# Patient Record
Sex: FEMALE | Race: WHITE | Employment: OTHER | URBAN - METROPOLITAN AREA
[De-identification: names, ages, dates, MRNs, and addresses within clinical notes are randomized per-mention and may not be internally consistent; named-entity substitution may affect disease eponyms.]

---

## 2024-03-21 DIAGNOSIS — Z01.818 PRE-OP TESTING: ICD-10-CM

## 2024-03-21 DIAGNOSIS — R91.1 LUNG NODULE: Primary | ICD-10-CM

## 2024-03-21 NOTE — PROGRESS NOTES
Bronchoscopy Scheduling Requestwith Bronchoscopy group provider    Pre-bronchoscopy visit: New patient visit with Bronchoscopy group provider  Please schedule procedure: Next available    Cytology on-site:  Yes  Location:  [facility]  Performing physician:  Advanced diagnostic bronchoscopist  Referring physician:  Shay Allen MD, No primary care provider on file.  Indication:  WILI nodule  Sedation / Anesthesia:  GA  Procedure:  Navigational bronchoscopy, Staging EBUS  Time:  Tier 3  Fluorscopy:   Yes  Imaging needed:  CT Evan - same day as procedure  Labs:  CBC, BMP  Meds:  None?  Special Considerations:  None  Reviewed by:  Rafael Mendez MD

## 2024-04-11 NOTE — H&P (VIEW-ONLY)
Patient: Rand Aguirre    [id number]  : 1960 -- AGE 64 y.o.    Provider: MAYITO Carty     Location [facility]   Service Date: 4/15/2024       Department of Medicine  Division of Pulmonary, Critical Care, and Sleep Medicine       [facility]  New Visit Note    Virtual or Telephone Consent  A telephone visit (audio only) between the patient (at the originating site) and the provider (at the distant site) was utilized to provide this telehealth service.   Verbal consent was requested and obtained from Rand Aguirre on this date, 04/15/24 for a telehealth visit.     HISTORY OF PRESENT ILLNESS     Pulm: Dr. Shay Allen / Beatrice Greco PA-C    HISTORY OF PRESENT ILLNESS   Rand Aguirre is a 64 y.o. female who presents to a [facility] for an evaluation with concerns of left lung nodule.  I have independently interviewed and examined the patient in the office and reviewed available records.    Current History  This is a patient of pulmonologist Dr. Shay Allen. Limited information in the EMR due to coming from and outside hospital system.  Upon review of the scanned note into the EMR patient had a PET/CT completed on 3/15/2024 showing a PET avid spiculated left upper lobe nodule as well as a few additional smaller nodular densities with PET avid mediastinal adenopathy.  CT chest and PET/CT are loaded into PACS.  Patient was referred to  interventional pulmonology for further assessment and tissue biopsy.    On today's visit, the patient reports both SOB at rest as well as LLANOS. Uses Breztri daily. Does DuoNebs every 4 hours with Albuterol in between every 4 hours. Started Tezspire therapy about 8 months ago. Reports a chronic cough; clear thick mucous. Lots of wheezing. No hemoptysis. Has gained about 54lbs in the past year due to prednisone use. No recent fever, night sweats, chills. ? Orthopnea. No known  lower leg swelling. GERD controlled with pantoprazole. Denies CP, palpitations.     Denies premature birth. Childhood asthma. Has never been on home oxygen therapy before.    Prior Notes & History       REVIEW OF SYSTEMS     REVIEW OF SYSTEMS  Review of Systems   Constitutional:  Positive for activity change, fatigue and unexpected weight change. Negative for appetite change, chills and fever.   HENT:  Positive for congestion and rhinorrhea. Negative for postnasal drip, sinus pressure, sinus pain, sneezing, sore throat, trouble swallowing and voice change.         Denies throat clearing   Eyes:  Negative for redness and itching.   Respiratory:  Positive for cough, chest tightness, shortness of breath and wheezing. Negative for stridor.    Cardiovascular:  Negative for chest pain, palpitations and leg swelling.        Denies orthopnea   Gastrointestinal:  Negative for abdominal pain, diarrhea, nausea and vomiting.        Denies acid reflux   Musculoskeletal:  Positive for back pain. Negative for arthralgias, joint swelling and myalgias.   Skin:  Negative for rash.   Allergic/Immunologic: Negative for immunocompromised state.   Neurological:  Positive for weakness. Negative for dizziness, tremors and headaches.   Hematological:  Does not bruise/bleed easily.   Psychiatric/Behavioral:  Negative for agitation and sleep disturbance. The patient is not nervous/anxious.         Denies depression   All other systems reviewed and are negative.      ALLERGIES AND MEDICATIONS     ALLERGIES  Allergies   Allergen Reactions    Codeine Rash, Shortness of breath and Unknown    Levofloxacin Shortness of breath    Tiotropium Bromide Hives and Rash     Palpitations. Duoneb aerosols are OK    Adhesive Tape-Silicones Unknown and Swelling    Desonide Unknown    Iodinated Contrast Media Swelling    Iodine Hives and Swelling    Sulfamethoxazole-Trimethoprim Rash     Rash in mouth       MEDICATIONS  Current Outpatient Medications    Medication Sig Dispense Refill    albuterol 90 mcg/actuation inhaler Inhale 2 puffs every 6 hours if needed.      amoxicillin-pot clavulanate (Augmentin) 875-125 mg tablet Take 1 tablet (875 mg) by mouth every 12 hours. For 7 days      azelastine (Astelin) 137 mcg (0.1 %) nasal spray Administer 1 spray into each nostril 2 times a day.      azithromycin (Zithromax) 250 mg tablet Take 1 tablet (250 mg) by mouth 3 (three) times a week.      biotin 10,000 mcg capsule Take 1 capsule (10 mg) by mouth once daily.      Breztri Aerosphere 160-9-4.8 mcg/actuation HFA aerosol inhaler Inhale 2 puffs 2 times a day.      cholecalciferol (Vitamin D3) 50 MCG (2000 UT) tablet Take 1 tablet (50 mcg) by mouth once daily.      cyanocobalamin (Vitamin B-12) 1,000 mcg tablet Take 1 tablet (1,000 mcg) by mouth once daily.      FLUoxetine (PROzac) 20 mg capsule Take 1 capsule (20 mg) by mouth once daily.      fluticasone (Flonase) 50 mcg/actuation nasal spray 2 sprays by Does not apply route once daily.      ipratropium-albuteroL (Duo-Neb) 0.5-2.5 mg/3 mL nebulizer solution Take 3 mL by nebulization every 4 hours if needed for shortness of breath or wheezing.      L.acidoph,saliva-B.bif-S.therm (Acidophilus Probiotic Blend) 175 mg capsule Take 1 capsule by mouth early in the morning..      levothyroxine (Synthroid, Levoxyl) 112 mcg tablet Take 1 tablet (112 mcg) by mouth once daily in the morning. Take before meals.      montelukast (Singulair) 10 mg tablet Take 1 tablet (10 mg) by mouth early in the morning..      predniSONE (Deltasone) 20 mg tablet once every 24 hours. 3 tablets once a day for 4 days, 2 tablets once a day for 4 days, 1 tablet once a day for 4 days, 0.5 tablet once a day for 4 days Orally Once a day for 16 days      predniSONE 10 mg tablets,dose pack 50mg/day x 3 days, 40 mg/day x 3 days, 30 mg/day x 3 days, 20 mg/day x 3 days, 10 mg/day x 3 days Orally Once a day for 15 days      pseudoephedrine (Sudafed) 30 mg tablet  "Take 1 tablet (30 mg) by mouth early in the morning..      Tezspire SubQ syringe Inject 210 mg under the skin every 28 (twenty-eight) days.      turmeric root extract 500 mg tablet Take 1 tablet by mouth early in the morning..      vitamin E acetate (VITAMIN E ORAL) Take 1 tablet by mouth once daily.       No current facility-administered medications for this visit.       PAST HISTORY     PAST MEDICAL HISTORY  She  has no past medical history on file.    PAST SURGICAL HISTORY  No past surgical history on file.    IMMUNIZATION HISTORY    There is no immunization history on file for this patient.    SOCIAL HISTORY  She  reports that she has quit smoking. Her smoking use included cigarettes. She started smoking about 47 years ago. She has a 70.9 pack-year smoking history. She has never used smokeless tobacco. She reports current alcohol use. She reports that she does not currently use drugs.     FAMILY HISTORY  No family history on file.      PHYSICAL EXAM     VITAL SIGNS: There were no vitals taken for this visit.     PREVIOUS WEIGHTS:  Wt Readings from Last 3 Encounters:   No data found for Wt       Physical Exam  No physical exam performed; virtual visit.  RESULTS/DATA     Pulmonary Function Test Results     No PFT on record    Chest Radiograph     No results found for this or any previous visit from the past 365 days.      Chest CT Scan   CTA Chest  2/23/24: Images in PACS    PET CT  3/15/24: Images in PACS    Echocardiogram & Cardiac Studies     No results found for this or any previous visit from the past 365 days.       Labwork & Pathology   Complete Blood Count  No results found for: \"WBC\", \"HGB\", \"HCT\", \"MCV\", \"PLT\"    Peripheral Eosinophil Count:   No results found for: \"EOSABS\"    Serum Immunoglobulin E:    No results found for: \"IGE\"     Metabolic Parameters  No results found for: \"NA\", \"K\", \"CL\", \"CO2\", \"ANIONGAP\", \"BUN\", \"CREATININE\", \"GFRMALE\", \"GFRF\", \"GLUCOSE\", \"CALCIUM\", \"PHOS\", \"ZINC\", \"AST\", " "\"ALT\"    Bronchoscopy & Pathology/Cultures       ASSESSMENT/PLAN     Ms. Aguirre is a 64 y.o. female; was referred to the [facility] for evaluation of No chief complaint on file.    Problem List and Orders  Diagnoses and all orders for this visit:  Lung nodule      Assessment and Plan / Recommendations:  Patient's visit was converted to a virtual visit.    1. WILI nodule/mass: patient had a PET/CT completed on 3/15/2024 showing a PET avid spiculated left upper lobe nodule as well as a few additional smaller nodular densities with PET avid mediastinal adenopathy.  CT chest and PET/CT are loaded into PACS.  Patient was referred to  interventional pulmonology for further assessment and tissue biopsy.  - Plan for bronchoscopy with biopsy. Nagivational CT, staging EBUS. CT marky prior  - Lab work prior to procedure; ordered. Will be completed tomorrow 4/16/24  - Not on any anticoagulation     I explained the procedure to the patient. We discussed that the bronchoscopy will be performed by a member of the Interventional Pulmonary Team; depending on scheduling and provider availability. We also discussed that the IP providers function as a team and not infrequently may have to fill in for one another if there are emergent issues that need attention at the same time. The patient / family expressed understanding and agreed to proceed. All questions were answered.     Patient's visit was converted to a virtual visit. Spoke with the patient via phone for 13 minutes.    Prep: 10 minutes  Phone/Video: 13 minutes  Total: 23 minutes    "

## 2024-04-11 NOTE — PROGRESS NOTES
Patient: Rand Aguirre    [id number]  : 1960 -- AGE 64 y.o.    Provider: MAYITO Carty     Location [facility]   Service Date: 4/15/2024       Department of Medicine  Division of Pulmonary, Critical Care, and Sleep Medicine       [facility]  New Visit Note    Virtual or Telephone Consent  A telephone visit (audio only) between the patient (at the originating site) and the provider (at the distant site) was utilized to provide this telehealth service.   Verbal consent was requested and obtained from Rand Aguirre on this date, 04/15/24 for a telehealth visit.     HISTORY OF PRESENT ILLNESS     Pulm: Dr. Shay Allen / Beatrice Greco PA-C    HISTORY OF PRESENT ILLNESS   Rand Aguirre is a 64 y.o. female who presents to a [facility] for an evaluation with concerns of left lung nodule.  I have independently interviewed and examined the patient in the office and reviewed available records.    Current History  This is a patient of pulmonologist Dr. Shay Allen. Limited information in the EMR due to coming from and outside hospital system.  Upon review of the scanned note into the EMR patient had a PET/CT completed on 3/15/2024 showing a PET avid spiculated left upper lobe nodule as well as a few additional smaller nodular densities with PET avid mediastinal adenopathy.  CT chest and PET/CT are loaded into PACS.  Patient was referred to  interventional pulmonology for further assessment and tissue biopsy.    On today's visit, the patient reports both SOB at rest as well as LLANOS. Uses Breztri daily. Does DuoNebs every 4 hours with Albuterol in between every 4 hours. Started Tezspire therapy about 8 months ago. Reports a chronic cough; clear thick mucous. Lots of wheezing. No hemoptysis. Has gained about 54lbs in the past year due to prednisone use. No recent fever, night sweats, chills. ? Orthopnea. No known  lower leg swelling. GERD controlled with pantoprazole. Denies CP, palpitations.     Denies premature birth. Childhood asthma. Has never been on home oxygen therapy before.    Prior Notes & History       REVIEW OF SYSTEMS     REVIEW OF SYSTEMS  Review of Systems   Constitutional:  Positive for activity change, fatigue and unexpected weight change. Negative for appetite change, chills and fever.   HENT:  Positive for congestion and rhinorrhea. Negative for postnasal drip, sinus pressure, sinus pain, sneezing, sore throat, trouble swallowing and voice change.         Denies throat clearing   Eyes:  Negative for redness and itching.   Respiratory:  Positive for cough, chest tightness, shortness of breath and wheezing. Negative for stridor.    Cardiovascular:  Negative for chest pain, palpitations and leg swelling.        Denies orthopnea   Gastrointestinal:  Negative for abdominal pain, diarrhea, nausea and vomiting.        Denies acid reflux   Musculoskeletal:  Positive for back pain. Negative for arthralgias, joint swelling and myalgias.   Skin:  Negative for rash.   Allergic/Immunologic: Negative for immunocompromised state.   Neurological:  Positive for weakness. Negative for dizziness, tremors and headaches.   Hematological:  Does not bruise/bleed easily.   Psychiatric/Behavioral:  Negative for agitation and sleep disturbance. The patient is not nervous/anxious.         Denies depression   All other systems reviewed and are negative.      ALLERGIES AND MEDICATIONS     ALLERGIES  Allergies   Allergen Reactions    Codeine Rash, Shortness of breath and Unknown    Levofloxacin Shortness of breath    Tiotropium Bromide Hives and Rash     Palpitations. Duoneb aerosols are OK    Adhesive Tape-Silicones Unknown and Swelling    Desonide Unknown    Iodinated Contrast Media Swelling    Iodine Hives and Swelling    Sulfamethoxazole-Trimethoprim Rash     Rash in mouth       MEDICATIONS  Current Outpatient Medications    Medication Sig Dispense Refill    albuterol 90 mcg/actuation inhaler Inhale 2 puffs every 6 hours if needed.      amoxicillin-pot clavulanate (Augmentin) 875-125 mg tablet Take 1 tablet (875 mg) by mouth every 12 hours. For 7 days      azelastine (Astelin) 137 mcg (0.1 %) nasal spray Administer 1 spray into each nostril 2 times a day.      azithromycin (Zithromax) 250 mg tablet Take 1 tablet (250 mg) by mouth 3 (three) times a week.      biotin 10,000 mcg capsule Take 1 capsule (10 mg) by mouth once daily.      Breztri Aerosphere 160-9-4.8 mcg/actuation HFA aerosol inhaler Inhale 2 puffs 2 times a day.      cholecalciferol (Vitamin D3) 50 MCG (2000 UT) tablet Take 1 tablet (50 mcg) by mouth once daily.      cyanocobalamin (Vitamin B-12) 1,000 mcg tablet Take 1 tablet (1,000 mcg) by mouth once daily.      FLUoxetine (PROzac) 20 mg capsule Take 1 capsule (20 mg) by mouth once daily.      fluticasone (Flonase) 50 mcg/actuation nasal spray 2 sprays by Does not apply route once daily.      ipratropium-albuteroL (Duo-Neb) 0.5-2.5 mg/3 mL nebulizer solution Take 3 mL by nebulization every 4 hours if needed for shortness of breath or wheezing.      L.acidoph,saliva-B.bif-S.therm (Acidophilus Probiotic Blend) 175 mg capsule Take 1 capsule by mouth early in the morning..      levothyroxine (Synthroid, Levoxyl) 112 mcg tablet Take 1 tablet (112 mcg) by mouth once daily in the morning. Take before meals.      montelukast (Singulair) 10 mg tablet Take 1 tablet (10 mg) by mouth early in the morning..      predniSONE (Deltasone) 20 mg tablet once every 24 hours. 3 tablets once a day for 4 days, 2 tablets once a day for 4 days, 1 tablet once a day for 4 days, 0.5 tablet once a day for 4 days Orally Once a day for 16 days      predniSONE 10 mg tablets,dose pack 50mg/day x 3 days, 40 mg/day x 3 days, 30 mg/day x 3 days, 20 mg/day x 3 days, 10 mg/day x 3 days Orally Once a day for 15 days      pseudoephedrine (Sudafed) 30 mg tablet  "Take 1 tablet (30 mg) by mouth early in the morning..      Tezspire SubQ syringe Inject 210 mg under the skin every 28 (twenty-eight) days.      turmeric root extract 500 mg tablet Take 1 tablet by mouth early in the morning..      vitamin E acetate (VITAMIN E ORAL) Take 1 tablet by mouth once daily.       No current facility-administered medications for this visit.       PAST HISTORY     PAST MEDICAL HISTORY  She  has no past medical history on file.    PAST SURGICAL HISTORY  No past surgical history on file.    IMMUNIZATION HISTORY    There is no immunization history on file for this patient.    SOCIAL HISTORY  She  reports that she has quit smoking. Her smoking use included cigarettes. She started smoking about 47 years ago. She has a 70.9 pack-year smoking history. She has never used smokeless tobacco. She reports current alcohol use. She reports that she does not currently use drugs.     FAMILY HISTORY  No family history on file.      PHYSICAL EXAM     VITAL SIGNS: There were no vitals taken for this visit.     PREVIOUS WEIGHTS:  Wt Readings from Last 3 Encounters:   No data found for Wt       Physical Exam  No physical exam performed; virtual visit.  RESULTS/DATA     Pulmonary Function Test Results     No PFT on record    Chest Radiograph     No results found for this or any previous visit from the past 365 days.      Chest CT Scan   CTA Chest  2/23/24: Images in PACS    PET CT  3/15/24: Images in PACS    Echocardiogram & Cardiac Studies     No results found for this or any previous visit from the past 365 days.       Labwork & Pathology   Complete Blood Count  No results found for: \"WBC\", \"HGB\", \"HCT\", \"MCV\", \"PLT\"    Peripheral Eosinophil Count:   No results found for: \"EOSABS\"    Serum Immunoglobulin E:    No results found for: \"IGE\"     Metabolic Parameters  No results found for: \"NA\", \"K\", \"CL\", \"CO2\", \"ANIONGAP\", \"BUN\", \"CREATININE\", \"GFRMALE\", \"GFRF\", \"GLUCOSE\", \"CALCIUM\", \"PHOS\", \"ZINC\", \"AST\", " "\"ALT\"    Bronchoscopy & Pathology/Cultures       ASSESSMENT/PLAN     Ms. Aguirre is a 64 y.o. female; was referred to the [facility] for evaluation of No chief complaint on file.    Problem List and Orders  Diagnoses and all orders for this visit:  Lung nodule      Assessment and Plan / Recommendations:  Patient's visit was converted to a virtual visit.    1. WILI nodule/mass: patient had a PET/CT completed on 3/15/2024 showing a PET avid spiculated left upper lobe nodule as well as a few additional smaller nodular densities with PET avid mediastinal adenopathy.  CT chest and PET/CT are loaded into PACS.  Patient was referred to  interventional pulmonology for further assessment and tissue biopsy.  - Plan for bronchoscopy with biopsy. Nagivational CT, staging EBUS. CT marky prior  - Lab work prior to procedure; ordered. Will be completed tomorrow 4/16/24  - Not on any anticoagulation     I explained the procedure to the patient. We discussed that the bronchoscopy will be performed by a member of the Interventional Pulmonary Team; depending on scheduling and provider availability. We also discussed that the IP providers function as a team and not infrequently may have to fill in for one another if there are emergent issues that need attention at the same time. The patient / family expressed understanding and agreed to proceed. All questions were answered.     Patient's visit was converted to a virtual visit. Spoke with the patient via phone for 13 minutes.    Prep: 10 minutes  Phone/Video: 13 minutes  Total: 23 minutes    "

## 2024-04-15 ENCOUNTER — TELEMEDICINE (OUTPATIENT)
Dept: PULMONOLOGY | Facility: CLINIC | Age: 64
End: 2024-04-15
Payer: COMMERCIAL

## 2024-04-15 DIAGNOSIS — R91.1 LUNG NODULE: Primary | ICD-10-CM

## 2024-04-15 PROCEDURE — 1036F TOBACCO NON-USER: CPT | Performed by: NURSE PRACTITIONER

## 2024-04-15 PROCEDURE — 99443 PR PHYS/QHP TELEPHONE EVALUATION 21-30 MIN: CPT | Performed by: NURSE PRACTITIONER

## 2024-04-15 RX ORDER — PSEUDOEPHEDRINE HCL 30 MG
30 TABLET ORAL DAILY
COMMUNITY

## 2024-04-15 RX ORDER — FLUTICASONE PROPIONATE 50 MCG
2 SPRAY, SUSPENSION (ML) NASAL
COMMUNITY
Start: 2022-07-13

## 2024-04-15 RX ORDER — AMOXICILLIN AND CLAVULANATE POTASSIUM 875; 125 MG/1; MG/1
875 TABLET, FILM COATED ORAL EVERY 12 HOURS
COMMUNITY
Start: 2024-04-09

## 2024-04-15 RX ORDER — AZITHROMYCIN 250 MG/1
250 TABLET, FILM COATED ORAL 3 TIMES WEEKLY
COMMUNITY

## 2024-04-15 RX ORDER — PREDNISONE 10 MG/1
TABLET ORAL
COMMUNITY
Start: 2022-12-14

## 2024-04-15 RX ORDER — ACETAMINOPHEN AND PHENYLEPHRINE HCL 325; 5 MG/1; MG/1
1 TABLET ORAL DAILY
COMMUNITY

## 2024-04-15 RX ORDER — TURMERIC ROOT EXTRACT 500 MG
1 TABLET ORAL DAILY
COMMUNITY

## 2024-04-15 RX ORDER — IPRATROPIUM BROMIDE AND ALBUTEROL SULFATE 2.5; .5 MG/3ML; MG/3ML
3 SOLUTION RESPIRATORY (INHALATION) EVERY 4 HOURS PRN
COMMUNITY

## 2024-04-15 RX ORDER — AZELASTINE 1 MG/ML
1 SPRAY, METERED NASAL 2 TIMES DAILY
COMMUNITY

## 2024-04-15 RX ORDER — PREDNISONE 20 MG/1
TABLET ORAL EVERY 24 HOURS
COMMUNITY
Start: 2023-02-14

## 2024-04-15 RX ORDER — LANOLIN ALCOHOL/MO/W.PET/CERES
1000 CREAM (GRAM) TOPICAL DAILY
COMMUNITY

## 2024-04-15 RX ORDER — ALBUTEROL SULFATE 90 UG/1
2 AEROSOL, METERED RESPIRATORY (INHALATION) EVERY 6 HOURS PRN
COMMUNITY

## 2024-04-15 RX ORDER — CHOLECALCIFEROL (VITAMIN D3) 50 MCG
50 TABLET ORAL DAILY
COMMUNITY

## 2024-04-15 RX ORDER — MONTELUKAST SODIUM 10 MG/1
10 TABLET ORAL DAILY
COMMUNITY

## 2024-04-15 RX ORDER — TEZEPELUMAB-EKKO 210 MG/1.9ML
210 INJECTION, SOLUTION SUBCUTANEOUS
COMMUNITY
Start: 2023-04-27

## 2024-04-15 RX ORDER — LEVOTHYROXINE SODIUM 112 UG/1
1 TABLET ORAL
COMMUNITY
Start: 2024-02-28

## 2024-04-15 RX ORDER — BUDESONIDE, GLYCOPYRROLATE, AND FORMOTEROL FUMARATE 160; 9; 4.8 UG/1; UG/1; UG/1
2 AEROSOL, METERED RESPIRATORY (INHALATION)
COMMUNITY
Start: 2023-03-03

## 2024-04-15 RX ORDER — GINSENG 100 MG
1 CAPSULE ORAL DAILY
COMMUNITY

## 2024-04-15 RX ORDER — FLUOXETINE HYDROCHLORIDE 20 MG/1
20 CAPSULE ORAL
COMMUNITY
Start: 2024-02-28

## 2024-04-15 ASSESSMENT — PATIENT HEALTH QUESTIONNAIRE - PHQ9
2. FEELING DOWN, DEPRESSED OR HOPELESS: NOT AT ALL
1. LITTLE INTEREST OR PLEASURE IN DOING THINGS: NOT AT ALL
SUM OF ALL RESPONSES TO PHQ9 QUESTIONS 1 & 2: 0

## 2024-04-15 ASSESSMENT — LIFESTYLE VARIABLES
SKIP TO QUESTIONS 9-10: 1
HOW MANY STANDARD DRINKS CONTAINING ALCOHOL DO YOU HAVE ON A TYPICAL DAY: 1 OR 2
HOW OFTEN DO YOU HAVE A DRINK CONTAINING ALCOHOL: 2-4 TIMES A MONTH
HOW OFTEN DO YOU HAVE SIX OR MORE DRINKS ON ONE OCCASION: NEVER
AUDIT-C TOTAL SCORE: 2

## 2024-04-15 ASSESSMENT — ENCOUNTER SYMPTOMS
PALPITATIONS: 0
EYE REDNESS: 0
TROUBLE SWALLOWING: 0
BRUISES/BLEEDS EASILY: 0
STRIDOR: 0
VOMITING: 0
ROS GI COMMENTS: DENIES ACID REFLUX
EYE ITCHING: 0
MYALGIAS: 0
ABDOMINAL PAIN: 0
NAUSEA: 0
TREMORS: 0
COUGH: 1
DIZZINESS: 0
WEAKNESS: 1
AGITATION: 0
SINUS PRESSURE: 0
SHORTNESS OF BREATH: 1
ACTIVITY CHANGE: 1
SLEEP DISTURBANCE: 0
WHEEZING: 1
DIARRHEA: 0
VOICE CHANGE: 0
ARTHRALGIAS: 0
NERVOUS/ANXIOUS: 0
FATIGUE: 1
FEVER: 0
BACK PAIN: 1
CHILLS: 0
HEADACHES: 0
SORE THROAT: 0
APPETITE CHANGE: 0
RHINORRHEA: 1
UNEXPECTED WEIGHT CHANGE: 1
CHEST TIGHTNESS: 1
SINUS PAIN: 0
JOINT SWELLING: 0

## 2024-04-25 ENCOUNTER — ANESTHESIA EVENT (OUTPATIENT)
Dept: GASTROENTEROLOGY | Facility: HOSPITAL | Age: 64
End: 2024-04-25
Payer: COMMERCIAL

## 2024-04-25 PROBLEM — J45.909 ASTHMA (HHS-HCC): Status: ACTIVE | Noted: 2024-04-25

## 2024-04-25 PROBLEM — M06.9 RHEUMATOID ARTHRITIS (MULTI): Status: ACTIVE | Noted: 2024-04-25

## 2024-04-25 PROBLEM — G47.33 OSA (OBSTRUCTIVE SLEEP APNEA): Status: ACTIVE | Noted: 2024-04-25

## 2024-04-25 PROBLEM — R91.8 PULMONARY NODULES: Status: ACTIVE | Noted: 2024-03-21

## 2024-04-25 PROBLEM — K21.9 GASTROESOPHAGEAL REFLUX DISEASE: Status: ACTIVE | Noted: 2024-04-25

## 2024-04-25 NOTE — ANESTHESIA PREPROCEDURE EVALUATION
Patient: Rand Aguirre    Procedure Information       Date/Time: 04/26/24 0800    Scheduled providers: Hugh Null MD; Janice Montano RN    Procedure: BRONCHOSCOPY    Location: [facility]            Relevant Problems   Anesthesia (within normal limits)  No family hx MH      Cardiac (within normal limits)      Pulmonary  Started O2 last week   (+) Asthma (HHS-HCC)   (+) ANTWAN (obstructive sleep apnea)   (+) Pulmonary nodules      Neuro (within normal limits)      GI  Gastrectomy 2017   (+) Gastroesophageal reflux disease      /Renal (within normal limits)      Liver (within normal limits)      Endocrine   (+) Hypothyroidism      Hematology (within normal limits)      Musculoskeletal   (+) Fibromyalgia   (+) Rheumatoid arthritis (Multi)   (+) Spinal stenosis      HEENT (within normal limits)      ID (within normal limits)      Skin (within normal limits)      GYN (within normal limits)       Clinical information reviewed:                 Vitals:    04/26/24 0802   BP: 133/87   Pulse: 87   Resp: 20   Temp: 36 °C (96.8 °F)   SpO2: 97%       History reviewed. No pertinent surgical history.  Past Medical History:   Diagnosis Date    Asthma (HHS-HCC)     COPD (chronic obstructive pulmonary disease) (Multi)     Lung disease        Current Outpatient Medications:     albuterol 90 mcg/actuation inhaler, Inhale 2 puffs every 6 hours if needed., Disp: , Rfl:     azelastine (Astelin) 137 mcg (0.1 %) nasal spray, Administer 1 spray into each nostril 2 times a day., Disp: , Rfl:     azithromycin (Zithromax) 250 mg tablet, Take 1 tablet (250 mg) by mouth 3 (three) times a week., Disp: , Rfl:     biotin 10,000 mcg capsule, Take 1 capsule (10 mg) by mouth once daily., Disp: , Rfl:     Breztri Aerosphere 160-9-4.8 mcg/actuation HFA aerosol inhaler, Inhale 2 puffs 2 times a day., Disp: , Rfl:     cholecalciferol (Vitamin D3) 50 MCG (2000 UT) tablet, Take 1 tablet (50 mcg) by mouth once daily., Disp: , Rfl:      cyanocobalamin (Vitamin B-12) 1,000 mcg tablet, Take 1 tablet (1,000 mcg) by mouth once daily., Disp: , Rfl:     FLUoxetine (PROzac) 20 mg capsule, Take 1 capsule (20 mg) by mouth once daily., Disp: , Rfl:     fluticasone (Flonase) 50 mcg/actuation nasal spray, 2 sprays by Does not apply route once daily., Disp: , Rfl:     ipratropium-albuteroL (Duo-Neb) 0.5-2.5 mg/3 mL nebulizer solution, Take 3 mL by nebulization every 4 hours if needed for shortness of breath or wheezing., Disp: , Rfl:     L.acidoph,saliva-B.bif-S.therm (Acidophilus Probiotic Blend) 175 mg capsule, Take 1 capsule by mouth early in the morning.., Disp: , Rfl:     levothyroxine (Synthroid, Levoxyl) 112 mcg tablet, Take 1 tablet (112 mcg) by mouth once daily in the morning. Take before meals., Disp: , Rfl:     montelukast (Singulair) 10 mg tablet, Take 1 tablet (10 mg) by mouth early in the morning.., Disp: , Rfl:     pseudoephedrine (Sudafed) 30 mg tablet, Take 1 tablet (30 mg) by mouth early in the morning.., Disp: , Rfl:     Tezspire SubQ syringe, Inject 210 mg under the skin every 28 (twenty-eight) days., Disp: , Rfl:     turmeric root extract 500 mg tablet, Take 1 tablet by mouth early in the morning.., Disp: , Rfl:     vitamin E acetate (VITAMIN E ORAL), Take 1 tablet by mouth once daily., Disp: , Rfl:     amoxicillin-pot clavulanate (Augmentin) 875-125 mg tablet, Take 1 tablet (875 mg) by mouth every 12 hours. For 7 days, Disp: , Rfl:     predniSONE (Deltasone) 20 mg tablet, once every 24 hours. 3 tablets once a day for 4 days, 2 tablets once a day for 4 days, 1 tablet once a day for 4 days, 0.5 tablet once a day for 4 days Orally Once a day for 16 days, Disp: , Rfl:     predniSONE 10 mg tablets,dose pack, 50mg/day x 3 days, 40 mg/day x 3 days, 30 mg/day x 3 days, 20 mg/day x 3 days, 10 mg/day x 3 days Orally Once a day for 15 days, Disp: , Rfl:   Prior to Admission medications    Medication Sig Start Date End Date Taking? Authorizing  Provider   albuterol 90 mcg/actuation inhaler Inhale 2 puffs every 6 hours if needed.   Yes Historical Provider, MD   azelastine (Astelin) 137 mcg (0.1 %) nasal spray Administer 1 spray into each nostril 2 times a day.   Yes Historical Provider, MD   azithromycin (Zithromax) 250 mg tablet Take 1 tablet (250 mg) by mouth 3 (three) times a week.   Yes Historical Provider, MD   biotin 10,000 mcg capsule Take 1 capsule (10 mg) by mouth once daily.   Yes Historical Provider, MD Sanjana Camilo 160-9-4.8 mcg/actuation HFA aerosol inhaler Inhale 2 puffs 2 times a day. 3/3/23  Yes Historical Provider, MD   cholecalciferol (Vitamin D3) 50 MCG (2000 UT) tablet Take 1 tablet (50 mcg) by mouth once daily.   Yes Historical Provider, MD   cyanocobalamin (Vitamin B-12) 1,000 mcg tablet Take 1 tablet (1,000 mcg) by mouth once daily.   Yes Historical Provider, MD   FLUoxetine (PROzac) 20 mg capsule Take 1 capsule (20 mg) by mouth once daily. 2/28/24  Yes Historical Provider, MD   fluticasone (Flonase) 50 mcg/actuation nasal spray 2 sprays by Does not apply route once daily. 7/13/22  Yes Historical Provider, MD   ipratropium-albuteroL (Duo-Neb) 0.5-2.5 mg/3 mL nebulizer solution Take 3 mL by nebulization every 4 hours if needed for shortness of breath or wheezing.   Yes Historical Provider, MD   L.acidoph,saliva-B.bif-S.therm (Acidophilus Probiotic Blend) 175 mg capsule Take 1 capsule by mouth early in the morning..   Yes Historical Provider, MD   levothyroxine (Synthroid, Levoxyl) 112 mcg tablet Take 1 tablet (112 mcg) by mouth once daily in the morning. Take before meals. 2/28/24  Yes Historical Provider, MD   montelukast (Singulair) 10 mg tablet Take 1 tablet (10 mg) by mouth early in the morning..   Yes Historical Provider, MD   pseudoephedrine (Sudafed) 30 mg tablet Take 1 tablet (30 mg) by mouth early in the morning..   Yes Historical Provider, MD   Tezspire SubQ syringe Inject 210 mg under the skin every 28 (twenty-eight)  "days. 4/27/23  Yes Historical Provider, MD   turmeric root extract 500 mg tablet Take 1 tablet by mouth early in the morning..   Yes Historical Provider, MD   vitamin E acetate (VITAMIN E ORAL) Take 1 tablet by mouth once daily.   Yes Historical Provider, MD   amoxicillin-pot clavulanate (Augmentin) 875-125 mg tablet Take 1 tablet (875 mg) by mouth every 12 hours. For 7 days 4/9/24   Historical Provider, MD   predniSONE (Deltasone) 20 mg tablet once every 24 hours. 3 tablets once a day for 4 days, 2 tablets once a day for 4 days, 1 tablet once a day for 4 days, 0.5 tablet once a day for 4 days Orally Once a day for 16 days 2/14/23   Historical Provider, MD   predniSONE 10 mg tablets,dose pack 50mg/day x 3 days, 40 mg/day x 3 days, 30 mg/day x 3 days, 20 mg/day x 3 days, 10 mg/day x 3 days Orally Once a day for 15 days 12/14/22   Historical Provider, MD     Allergies   Allergen Reactions    Codeine Rash, Shortness of breath and Unknown    Levofloxacin Shortness of breath    Tiotropium Bromide Hives and Rash     Palpitations. Duoneb aerosols are OK    Adhesive Tape-Silicones Unknown and Swelling    Desonide Unknown    Iodinated Contrast Media Swelling    Iodine Hives and Swelling    Sulfamethoxazole-Trimethoprim Rash     Rash in mouth     Social History     Tobacco Use    Smoking status: Former     Current packs/day: 1.50     Average packs/day: 1.5 packs/day for 47.3 years (71.0 ttl pk-yrs)     Types: Cigarettes     Start date: 1977    Smokeless tobacco: Never    Tobacco comments:     1.5 PPD started 1977 quit 2016   Substance Use Topics    Alcohol use: Yes         Chemistry    No results found for: \"NA\", \"K\", \"CL\", \"CO2\", \"BUN\", \"CREATININE\", \"GLU\" No results found for: \"CALCIUM\", \"ALKPHOS\", \"AST\", \"ALT\", \"BILITOT\"       No results found for: \"WBC\", \"HGB\", \"HCT\", \"PLT\"  No results found for: \"PROTIME\", \"PTT\", \"INR\"  No results found for this or any previous visit (from the past 4464 hour(s)).  No results found for " this or any previous visit from the past 1095 days.     NPO Detail:  No data recorded     PHYSICAL EXAM    Anesthesia Plan    History of general anesthesia?: yes  History of complications of general anesthesia?: no    ASA 3     general     intravenous induction   Trial extubation is planned.  Anesthetic plan and risks discussed with patient.  Use of blood products discussed with who consented to blood products.    Plan discussed with CRNA.

## 2024-04-26 ENCOUNTER — ANESTHESIA (OUTPATIENT)
Dept: GASTROENTEROLOGY | Facility: HOSPITAL | Age: 64
End: 2024-04-26
Payer: COMMERCIAL

## 2024-04-26 ENCOUNTER — HOSPITAL ENCOUNTER (OUTPATIENT)
Dept: GASTROENTEROLOGY | Facility: HOSPITAL | Age: 64
Setting detail: OUTPATIENT SURGERY
Discharge: HOME | End: 2024-04-26
Payer: COMMERCIAL

## 2024-04-26 ENCOUNTER — HOSPITAL ENCOUNTER (OUTPATIENT)
Dept: RADIOLOGY | Facility: HOSPITAL | Age: 64
Discharge: HOME | End: 2024-04-26
Payer: COMMERCIAL

## 2024-04-26 VITALS
HEIGHT: 64 IN | OXYGEN SATURATION: 99 % | DIASTOLIC BLOOD PRESSURE: 77 MMHG | WEIGHT: 191.8 LBS | SYSTOLIC BLOOD PRESSURE: 125 MMHG | RESPIRATION RATE: 20 BRPM | TEMPERATURE: 96.8 F | BODY MASS INDEX: 32.74 KG/M2 | HEART RATE: 90 BPM

## 2024-04-26 DIAGNOSIS — R91.1 LUNG NODULE: ICD-10-CM

## 2024-04-26 PROBLEM — E03.9 HYPOTHYROIDISM: Status: ACTIVE | Noted: 2024-04-26

## 2024-04-26 PROBLEM — M79.7 FIBROMYALGIA: Status: ACTIVE | Noted: 2024-04-26

## 2024-04-26 PROBLEM — M48.00 SPINAL STENOSIS: Status: ACTIVE | Noted: 2024-04-26

## 2024-04-26 PROCEDURE — 88341 IMHCHEM/IMCYTCHM EA ADD ANTB: CPT | Mod: TC,59,SUR | Performed by: INTERNAL MEDICINE

## 2024-04-26 PROCEDURE — 7100000002 HC RECOVERY ROOM TIME - EACH INCREMENTAL 1 MINUTE

## 2024-04-26 PROCEDURE — 2500000004 HC RX 250 GENERAL PHARMACY W/ HCPCS (ALT 636 FOR OP/ED): Performed by: NURSE ANESTHETIST, CERTIFIED REGISTERED

## 2024-04-26 PROCEDURE — 71250 CT THORAX DX C-: CPT | Performed by: RADIOLOGY

## 2024-04-26 PROCEDURE — 2500000002 HC RX 250 W HCPCS SELF ADMINISTERED DRUGS (ALT 637 FOR MEDICARE OP, ALT 636 FOR OP/ED): Performed by: ANESTHESIOLOGY

## 2024-04-26 PROCEDURE — 88173 CYTOPATH EVAL FNA REPORT: CPT | Performed by: PATHOLOGY

## 2024-04-26 PROCEDURE — A31652 PR BRNCHSC EBUS GUIDED SAMPL 1/2 NODE STATION/STRUX: Performed by: ANESTHESIOLOGY

## 2024-04-26 PROCEDURE — 94645 CONT INHLJ TX EACH ADDL HOUR: CPT | Performed by: INTERNAL MEDICINE

## 2024-04-26 PROCEDURE — 7100000010 HC PHASE TWO TIME - EACH INCREMENTAL 1 MINUTE

## 2024-04-26 PROCEDURE — 3700000001 HC GENERAL ANESTHESIA TIME - INITIAL BASE CHARGE

## 2024-04-26 PROCEDURE — 88342 IMHCHEM/IMCYTCHM 1ST ANTB: CPT | Performed by: STUDENT IN AN ORGANIZED HEALTH CARE EDUCATION/TRAINING PROGRAM

## 2024-04-26 PROCEDURE — 94644 CONT INHLJ TX 1ST HOUR: CPT | Performed by: INTERNAL MEDICINE

## 2024-04-26 PROCEDURE — G0452 MOLECULAR PATHOLOGY INTERPR: HCPCS | Performed by: INTERNAL MEDICINE

## 2024-04-26 PROCEDURE — 2720000007 HC OR 272 NO HCPCS

## 2024-04-26 PROCEDURE — 81458 SO GSAP DNA CPY NMBR&MCRSTL: CPT | Performed by: INTERNAL MEDICINE

## 2024-04-26 PROCEDURE — 88360 TUMOR IMMUNOHISTOCHEM/MANUAL: CPT | Performed by: STUDENT IN AN ORGANIZED HEALTH CARE EDUCATION/TRAINING PROGRAM

## 2024-04-26 PROCEDURE — 88341 IMHCHEM/IMCYTCHM EA ADD ANTB: CPT | Performed by: STUDENT IN AN ORGANIZED HEALTH CARE EDUCATION/TRAINING PROGRAM

## 2024-04-26 PROCEDURE — 71250 CT THORAX DX C-: CPT

## 2024-04-26 PROCEDURE — 88305 TISSUE EXAM BY PATHOLOGIST: CPT | Performed by: STUDENT IN AN ORGANIZED HEALTH CARE EDUCATION/TRAINING PROGRAM

## 2024-04-26 PROCEDURE — 88173 CYTOPATH EVAL FNA REPORT: CPT | Mod: TC,MCY | Performed by: INTERNAL MEDICINE

## 2024-04-26 PROCEDURE — 3700000002 HC GENERAL ANESTHESIA TIME - EACH INCREMENTAL 1 MINUTE

## 2024-04-26 PROCEDURE — 7100000001 HC RECOVERY ROOM TIME - INITIAL BASE CHARGE

## 2024-04-26 PROCEDURE — 88172 CYTP DX EVAL FNA 1ST EA SITE: CPT | Performed by: PATHOLOGY

## 2024-04-26 PROCEDURE — 2500000005 HC RX 250 GENERAL PHARMACY W/O HCPCS: Performed by: NURSE ANESTHETIST, CERTIFIED REGISTERED

## 2024-04-26 PROCEDURE — 7100000009 HC PHASE TWO TIME - INITIAL BASE CHARGE

## 2024-04-26 PROCEDURE — A31652 PR BRNCHSC EBUS GUIDED SAMPL 1/2 NODE STATION/STRUX: Performed by: NURSE ANESTHETIST, CERTIFIED REGISTERED

## 2024-04-26 PROCEDURE — 31652 BRONCH EBUS SAMPLNG 1/2 NODE: CPT | Performed by: INTERNAL MEDICINE

## 2024-04-26 RX ORDER — ALBUTEROL SULFATE 0.83 MG/ML
2.5 SOLUTION RESPIRATORY (INHALATION) ONCE AS NEEDED
Status: COMPLETED | OUTPATIENT
Start: 2024-04-26 | End: 2024-04-26

## 2024-04-26 RX ORDER — ONDANSETRON HYDROCHLORIDE 2 MG/ML
4 INJECTION, SOLUTION INTRAVENOUS ONCE AS NEEDED
Status: DISCONTINUED | OUTPATIENT
Start: 2024-04-26 | End: 2024-04-27 | Stop reason: HOSPADM

## 2024-04-26 RX ORDER — PROPOFOL 10 MG/ML
INJECTION, EMULSION INTRAVENOUS AS NEEDED
Status: DISCONTINUED | OUTPATIENT
Start: 2024-04-26 | End: 2024-04-26

## 2024-04-26 RX ORDER — LIDOCAINE HYDROCHLORIDE 10 MG/ML
0.1 INJECTION INFILTRATION; PERINEURAL ONCE
Status: DISCONTINUED | OUTPATIENT
Start: 2024-04-26 | End: 2024-04-27 | Stop reason: HOSPADM

## 2024-04-26 RX ORDER — ALBUTEROL SULFATE 0.83 MG/ML
2.5 SOLUTION RESPIRATORY (INHALATION) ONCE
Status: COMPLETED | OUTPATIENT
Start: 2024-04-26 | End: 2024-04-26

## 2024-04-26 RX ORDER — PROPOFOL 10 MG/ML
INJECTION, EMULSION INTRAVENOUS CONTINUOUS PRN
Status: DISCONTINUED | OUTPATIENT
Start: 2024-04-26 | End: 2024-04-26

## 2024-04-26 RX ORDER — PHENYLEPHRINE HCL IN 0.9% NACL 0.4MG/10ML
SYRINGE (ML) INTRAVENOUS AS NEEDED
Status: DISCONTINUED | OUTPATIENT
Start: 2024-04-26 | End: 2024-04-26

## 2024-04-26 RX ORDER — ROCURONIUM BROMIDE 10 MG/ML
INJECTION, SOLUTION INTRAVENOUS AS NEEDED
Status: DISCONTINUED | OUTPATIENT
Start: 2024-04-26 | End: 2024-04-26

## 2024-04-26 RX ORDER — ONDANSETRON HYDROCHLORIDE 2 MG/ML
INJECTION, SOLUTION INTRAVENOUS AS NEEDED
Status: DISCONTINUED | OUTPATIENT
Start: 2024-04-26 | End: 2024-04-26

## 2024-04-26 RX ORDER — SODIUM CHLORIDE, SODIUM LACTATE, POTASSIUM CHLORIDE, CALCIUM CHLORIDE 600; 310; 30; 20 MG/100ML; MG/100ML; MG/100ML; MG/100ML
100 INJECTION, SOLUTION INTRAVENOUS CONTINUOUS
Status: DISCONTINUED | OUTPATIENT
Start: 2024-04-26 | End: 2024-04-27 | Stop reason: HOSPADM

## 2024-04-26 RX ORDER — ACETAMINOPHEN 325 MG/1
650 TABLET ORAL EVERY 4 HOURS PRN
Status: DISCONTINUED | OUTPATIENT
Start: 2024-04-26 | End: 2024-04-27 | Stop reason: HOSPADM

## 2024-04-26 RX ORDER — LIDOCAINE HYDROCHLORIDE 20 MG/ML
INJECTION, SOLUTION INFILTRATION; PERINEURAL AS NEEDED
Status: DISCONTINUED | OUTPATIENT
Start: 2024-04-26 | End: 2024-04-26

## 2024-04-26 RX ADMIN — PROPOFOL 150 MCG/KG/MIN: 10 INJECTION, EMULSION INTRAVENOUS at 08:31

## 2024-04-26 RX ADMIN — PROPOFOL 130 MG: 10 INJECTION, EMULSION INTRAVENOUS at 08:32

## 2024-04-26 RX ADMIN — Medication 160 MCG: at 08:54

## 2024-04-26 RX ADMIN — LIDOCAINE HYDROCHLORIDE 50 MG: 20 INJECTION, SOLUTION INFILTRATION; PERINEURAL at 08:34

## 2024-04-26 RX ADMIN — Medication 80 MCG: at 08:45

## 2024-04-26 RX ADMIN — ONDANSETRON 4 MG: 2 INJECTION INTRAMUSCULAR; INTRAVENOUS at 08:56

## 2024-04-26 RX ADMIN — Medication 160 MCG: at 08:47

## 2024-04-26 RX ADMIN — SUGAMMADEX 200 MG: 100 INJECTION, SOLUTION INTRAVENOUS at 09:17

## 2024-04-26 RX ADMIN — ROCURONIUM BROMIDE 50 MG: 10 INJECTION INTRAVENOUS at 08:34

## 2024-04-26 RX ADMIN — DEXAMETHASONE SODIUM PHOSPHATE 10 MG: 4 INJECTION INTRA-ARTICULAR; INTRALESIONAL; INTRAMUSCULAR; INTRAVENOUS; SOFT TISSUE at 08:42

## 2024-04-26 RX ADMIN — SODIUM CHLORIDE, SODIUM LACTATE, POTASSIUM CHLORIDE, AND CALCIUM CHLORIDE: 600; 310; 30; 20 INJECTION, SOLUTION INTRAVENOUS at 08:26

## 2024-04-26 RX ADMIN — ALBUTEROL SULFATE 2.5 MG: 2.5 SOLUTION RESPIRATORY (INHALATION) at 10:15

## 2024-04-26 RX ADMIN — Medication 200 MCG: at 09:08

## 2024-04-26 RX ADMIN — Medication 160 MCG: at 09:02

## 2024-04-26 RX ADMIN — ALBUTEROL SULFATE 2.5 MG: 2.5 SOLUTION RESPIRATORY (INHALATION) at 10:26

## 2024-04-26 RX ADMIN — Medication 160 MCG: at 08:59

## 2024-04-26 ASSESSMENT — PAIN SCALES - GENERAL
PAINLEVEL_OUTOF10: 0 - NO PAIN

## 2024-04-26 ASSESSMENT — PAIN - FUNCTIONAL ASSESSMENT
PAIN_FUNCTIONAL_ASSESSMENT: 0-10

## 2024-04-26 ASSESSMENT — COLUMBIA-SUICIDE SEVERITY RATING SCALE - C-SSRS: 1. IN THE PAST MONTH, HAVE YOU WISHED YOU WERE DEAD OR WISHED YOU COULD GO TO SLEEP AND NOT WAKE UP?: NO

## 2024-04-26 NOTE — DISCHARGE INSTRUCTIONS
The anesthetics, sedatives or narcotics which were given to you today will be acting in your body for the next 24 hours, so you might feel a little sleepy or groggy. This feeling should slowly wear off.   Carefully read and follow the instructions below:   You received sedation today.   Do not drive or operate machinery or power tools of any kind.   No alcoholic beverages today, not even beer or wine.   No over the counter medications that contain alcohol or may cause drowsiness.   Do not make important decisions or sign legal documents.     Do not use Aspirin containing products or non-steroidal medications for the next 24 hours.  (Examples of these types of medications include: Advil, Aleve, Ecotrin, Ibuprofen, Motrin or Naprosyn.  This list is not all-inclusive.  Check with your physician or pharmacist before resuming these medications.   Tylenol, cough medicine, cough drops or throat lozenges may be used when you are allowed to resume eating and drinking.     Call your physician if any of these symptoms occur:   High fever over 101 degrees or chills (a low grade fever is common for 24 hours)   Rash or hives   Persistent nausea or vomiting   Inability to urinate within 8 hours after the procedure    Go directly to the emergency room if you notice any of the following:   Shortness of breath   Chest pain              Coughing up large amounts of bright red blood greater than a teaspoonful of blood clots (about a teaspoonful for the next 24-48 hours is normal, especially if you had a biopsy)    Resume all normal medications unless directed otherwise by your doctor.     Your doctor recommends these additional instructions:    Follow up with your referring physician as previously scheduled.    If you experience any problems or have any questions following discharge, please call:   Before 5 pm: [phone]   After 5pm and on weekends: [phone] / [phone] and ask for the Pulmonary Fellow on-call (Pager  Number: [phone])

## 2024-04-26 NOTE — LETTER
Dear, Rand Aguirre       3/25/2024                                                                                   INFORMATION FOR YOUR PROCEDURE    INSTRUCTIONS MUST BE FOLLOWED OR YOU RUN THE RISK OF YOUR CASE BEING CANCELED    Information is attached to this e-mail for your upcoming procedure. (Look for the paperclip in your email to access this information)  Lab requisitions (if needed), are also included as well as procedural instructions.       You are scheduled for your Bronchoscopy on 4/26/24,  , with Dr. Hugh Null [facility] [address] [place] [address]    07:00 AM    - CT  Scan  [facility]   2nd Floor Radiology  [address]    When finished with the CT scan,   please make your way to [facility] Room 1300.  This is right around the corner from [facility].  Located on the first floor.  There are information desks there for your convenience and if you have questions.    Check In will be at  7:15  AM.  This allows us to prepare you for the actual procedure.                                        Bronchoscopy   Location:  [address] [facility]                                         Bronchoscopy / Endoscopy Suite    NPO (No food or drink) after midnight the night before your procedure. This includes coffee, water, and soda, hard candy, gum or mints.  If taking your morning medications, a small sip of water is allowed to get the medication down.    For your safety, you must have a responsible, adult  accompany you to your procedure.  You will not be permitted to drive yourself home if you have received any type of anesthesia or sedation.    Automated calls about your upcoming scheduled appointments can be quite confusing for patients.    The times may vary depending on what you have scheduled for procedure day. Follow the time/s I have given you on your information sheet so there is no confusion.  Be aware you may receive conflicting  times from different departments.      Blood work will need to be done prior to your procedure, preferably at a  Facility.  There are no restrictions for testing. I have attached a requisition for you.    Our Nurse Practitioner, Andrew Dickey CNP, will call you on 4/15/24, @ 4:00 PM    This is a phone visit to go over your medical history prior to your procedure, and is a necessary part of your medical workup.  The patient must be present at this visit.      Please reach out to me with any questions you may have  Shalonda  [phone] or Selvin @ [phone]    Have a nice day!    Shalonda Barr     (Bronchoscopy)  Interventional Pulmonology    MD Vera Arana MD Sameer Avasarala, MD Catalina Teba, MD Andrew Dunatchik, MD      [facility]  Pulmonary, Critical Care and Sleep Medicine  [address] [place] [address]  P -[phone]  - [phone]  [email or web address]

## 2024-04-26 NOTE — ANESTHESIA POSTPROCEDURE EVALUATION
Patient: Rand Aguirre    Procedure Summary       Date: 04/26/24 Room / Location: [facility]    Anesthesia Start: 0826 Anesthesia Stop:     Procedure: BRONCHOSCOPY Diagnosis: Lung nodule    Scheduled Providers: Hugh Null MD; Janice Montano RN Responsible Provider: Georgina Sanderson MD    Anesthesia Type: general ASA Status: 3            Anesthesia Type: general    Vitals Value Taken Time   /57 04/26/24 0932   Temp 36 04/26/24 0932   Pulse 88 04/26/24 0932   Resp 24 04/26/24 0932   SpO2 97 04/26/24 0932       Anesthesia Post Evaluation    Patient location during evaluation: PACU  Patient participation: complete - patient participated  Level of consciousness: awake and alert  Pain management: adequate  Airway patency: patent  Cardiovascular status: acceptable and hemodynamically stable  Respiratory status: acceptable and room air  Hydration status: acceptable  Postoperative Nausea and Vomiting: none        No notable events documented.

## 2024-04-26 NOTE — ANESTHESIA PROCEDURE NOTES
Airway  Date/Time: 4/26/2024 8:38 AM  Urgency: elective    Airway not difficult    Staffing  Performed: CRNA   Authorized by: Georgina Sanderson MD    Performed by: SILVA Garcia-OSITO  Patient location during procedure: OR    Indications and Patient Condition  Indications for airway management: anesthesia  Spontaneous Ventilation: absent  Sedation level: deep  Preoxygenated: yes  Patient position: sniffing  Mask difficulty assessment: 2 - vent by mask + OA or adjuvant +/- NMBA    Final Airway Details  Final airway type: endotracheal airway      Successful airway: ETT  Cuffed: yes   Successful intubation technique: direct laryngoscopy  Facilitating devices/methods: intubating stylet  Endotracheal tube insertion site: oral  Blade: Marianne  Blade size: #4  ETT size (mm): 8.5  Cormack-Lehane Classification: grade I - full view of glottis  Placement verified by: chest auscultation and capnometry   Measured from: lips  ETT to lips (cm): 20  Number of attempts at approach: 1

## 2024-04-26 NOTE — INTERVAL H&P NOTE
H&P reviewed. The patient was examined and there are no changes to the H&P. She has had dyspnea and a chronic cough for 2 months. Reports stable symptoms for two months. Started supplemental oxygen 1-1.5 weeks ago, has brought a portable oxygen supply. CT shows major airways are patent. I discussed with her the procedure today is to establish a diagnosis and is unlikely to provide immediate relief of her symptoms. CT shows some new atelectasis in the WILI, but it is distal. At this time, I do not see additional steps we can take to help improve her respiratory status - breathing treatment given in pre-procedure setting and will give IV steroids during the case. Proceed with diagnostic bronchoscopy.    Hugh Null MD  Staff Physician - Interventional Pulmonology  8:26 AM  04/26/24

## 2024-05-01 LAB
LAB AP ASR DISCLAIMER: NORMAL
LABORATORY COMMENT REPORT: NORMAL
PATH REPORT.COMMENTS IMP SPEC: NORMAL
PATH REPORT.FINAL DX SPEC: NORMAL
PATH REPORT.GROSS SPEC: NORMAL
PATH REPORT.TOTAL CANCER: NORMAL

## 2024-05-03 ENCOUNTER — TELEPHONE (OUTPATIENT)
Dept: PULMONOLOGY | Facility: HOSPITAL | Age: 64
End: 2024-05-03
Payer: COMMERCIAL

## 2024-05-03 LAB
LAB AP ASR DISCLAIMER: NORMAL
LABORATORY COMMENT REPORT: NORMAL
LABORATORY COMMENT REPORT: NORMAL
PATH REPORT.FINAL DX SPEC: NORMAL
PATH REPORT.GROSS SPEC: NORMAL
PATH REPORT.INTRAOP OBS SPEC DOC: NORMAL
PATH REPORT.TOTAL CANCER: NORMAL

## 2024-05-03 NOTE — RESULT ENCOUNTER NOTE
Dick Montalvo and Shalonda,    I called and discussed the results with Rand Agiurre on 05/03/24. She stated Dr. Allen already reached out to her and she has an appointment with an oncologist as well. I don't see anything in [facility] for an onc appointment - likely someone at a different system.     Could you please fax him the bronchoscopy report, results of samples from that day (cytology and surg path - the oncologist will need these), and this note?    Thanks    Hugh

## 2024-05-10 LAB
ELECTRONICALLY SIGNED BY: NORMAL
FOCUSED SOLID TUMOR DNA/RNA RESULTS: NORMAL